# Patient Record
Sex: FEMALE | Race: WHITE | NOT HISPANIC OR LATINO | Employment: UNEMPLOYED | URBAN - METROPOLITAN AREA
[De-identification: names, ages, dates, MRNs, and addresses within clinical notes are randomized per-mention and may not be internally consistent; named-entity substitution may affect disease eponyms.]

---
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
## 2022-03-07 ENCOUNTER — HOSPITAL ENCOUNTER (OUTPATIENT)
Facility: HOSPITAL | Age: 22
Discharge: HOME | End: 2022-03-07
Attending: OBSTETRICS & GYNECOLOGY | Admitting: OBSTETRICS & GYNECOLOGY

## 2022-03-07 VITALS
TEMPERATURE: 97.2 F | HEIGHT: 69 IN | HEART RATE: 106 BPM | WEIGHT: 132.94 LBS | BODY MASS INDEX: 19.69 KG/M2 | SYSTOLIC BLOOD PRESSURE: 102 MMHG | OXYGEN SATURATION: 98 % | RESPIRATION RATE: 18 BRPM | DIASTOLIC BLOOD PRESSURE: 64 MMHG

## 2022-03-07 PROBLEM — O47.00 PREMATURE UTERINE CONTRACTIONS: Status: ACTIVE | Noted: 2022-03-07

## 2022-03-07 LAB — SARS-COV-2 RNA PNL SPEC NAA+PROBE: NOT DETECTED

## 2022-03-07 PROCEDURE — 96375 TX/PRO/DX INJ NEW DRUG ADDON: CPT

## 2022-03-07 PROCEDURE — C9803 HOPD COVID-19 SPEC COLLECT: HCPCS

## 2022-03-07 PROCEDURE — 0 MORPHINE SULFATE 4 MG/ML SOLUTION: Performed by: OBSTETRICS & GYNECOLOGY

## 2022-03-07 PROCEDURE — G0463 HOSPITAL OUTPT CLINIC VISIT: HCPCS

## 2022-03-07 PROCEDURE — 87635 SARS-COV-2 COVID-19 AMP PRB: CPT | Performed by: OBSTETRICS & GYNECOLOGY

## 2022-03-07 PROCEDURE — 96374 THER/PROPH/DIAG INJ IV PUSH: CPT

## 2022-03-07 RX ORDER — SODIUM CHLORIDE 0.9 % (FLUSH) 0.9 %
10 SYRINGE (ML) INJECTION EVERY 12 HOURS SCHEDULED
Status: DISCONTINUED | OUTPATIENT
Start: 2022-03-07 | End: 2022-03-07 | Stop reason: HOSPADM

## 2022-03-07 RX ORDER — MORPHINE SULFATE 1 MG/ML
2 INJECTION, SOLUTION EPIDURAL; INTRATHECAL; INTRAVENOUS
Status: DISCONTINUED | OUTPATIENT
Start: 2022-03-07 | End: 2022-03-07

## 2022-03-07 RX ORDER — SODIUM CHLORIDE, SODIUM LACTATE, POTASSIUM CHLORIDE, CALCIUM CHLORIDE 600; 310; 30; 20 MG/100ML; MG/100ML; MG/100ML; MG/100ML
999 INJECTION, SOLUTION INTRAVENOUS CONTINUOUS
Status: DISCONTINUED | OUTPATIENT
Start: 2022-03-07 | End: 2022-03-07 | Stop reason: HOSPADM

## 2022-03-07 RX ORDER — FAMOTIDINE 10 MG/ML
20 INJECTION, SOLUTION INTRAVENOUS 2 TIMES DAILY
Status: DISCONTINUED | OUTPATIENT
Start: 2022-03-07 | End: 2022-03-07 | Stop reason: HOSPADM

## 2022-03-07 RX ORDER — MORPHINE SULFATE 4 MG/ML
2 INJECTION, SOLUTION INTRAMUSCULAR; INTRAVENOUS
Status: DISCONTINUED | OUTPATIENT
Start: 2022-03-07 | End: 2022-03-07 | Stop reason: HOSPADM

## 2022-03-07 RX ORDER — SODIUM CHLORIDE 0.9 % (FLUSH) 0.9 %
10 SYRINGE (ML) INJECTION AS NEEDED
Status: DISCONTINUED | OUTPATIENT
Start: 2022-03-07 | End: 2022-03-07 | Stop reason: HOSPADM

## 2022-03-07 RX ADMIN — FAMOTIDINE 20 MG: 10 INJECTION INTRAVENOUS at 17:24

## 2022-03-07 RX ADMIN — SODIUM CHLORIDE, POTASSIUM CHLORIDE, SODIUM LACTATE AND CALCIUM CHLORIDE 999 ML/HR: 600; 310; 30; 20 INJECTION, SOLUTION INTRAVENOUS at 17:18

## 2022-03-07 RX ADMIN — MORPHINE SULFATE 2 MG: 4 INJECTION INTRAVENOUS at 19:49

## 2022-03-07 NOTE — NURSING NOTE
"Pt here per ambulatory from [facility] stating she was having ctx's. Pt stated she was having ctx's since a month ago she had fallen on ice x2 and was observed, \"didn't do anything.\" referring to \" at [place].\" Appearing aggitated and states she \"hates my doctor and not going back.\" She stated she was having ctx's since the falls but days ago they stopped and \"suddenly started back up x last 3 days. Denies any vaginal bleeding, no leakage of fluid, positive fetal movement noted. SVE performed 1cm, thick and -3. Pt rates pain \"6\" constant in back and low abd, but when ct'x start \"10.\" appears in no distsress or discomfort, talking through ctx's. Placed on EFM and oriented to room, explained plan of care and will continue to monitor.   "

## 2022-03-08 NOTE — EXTERNAL PATIENT INSTRUCTIONS
Patient Education   Table of Contents       Third Trimester of Pregnancy     To view videos and all your education online visit,   [email or web address]   or scan this QR code with your smartphone.                  Third Trimester of Pregnancy        The third trimester of pregnancy is from week 28 through week 40. This is also called months 7 through 9. This trimester is when your unborn baby (fetus) is growing very fast. At the end of the ninth month, the unborn baby is about 20 inches long. It weighs about 6?10 pounds.     Body changes during your third trimester   Your body continues to go through many changes during this time. The changes vary and generally return to normal after the baby is born.   Physical changes         Your weight will continue to increase. You may gain 25?35 pounds (11?16 kg) by the end of the pregnancy. If you are underweight, you may gain 28?40 lb (about 13?18 kg). If you are overweight, you may gain 15?25 lb (about 7?11 kg).       You may start to get stretch marks on your hips, belly (abdomen), and breasts.       Your breasts will continue to grow and may hurt. A yellow fluid (colostrum) may leak from your breasts. This is the first milk you are making for your baby.       You may have changes in your hair.       Your belly button may stick out.       You may have more swelling in your hands, face, or ankles.     Health changes         You may have heartburn.       You may have trouble pooping (constipation).       You may get hemorrhoids. These are swollen veins in the butt that can itch or get painful.       You may have swollen veins (varicose veins) in your legs.       You may have more body aches in the pelvis, back, or thighs.       You may have more tingling or numbness in your hands, arms, and legs. The skin on your belly may also feel numb.       You may feel short of breath as your womb (uterus) gets bigger.     Other changes         You may pee (urinate) more  "often.       You may have more problems sleeping.       You may notice the unborn baby \"dropping,\" or moving lower in your belly.       You may have more discharge coming from your vagina.       Your joints may feel loose, and you may have pain around your pelvic bone.     Follow these instructions at home:   Medicines         Take over-the-counter and prescription medicines only as told by your doctor. Some medicines are not safe during pregnancy.       Take a prenatal vitamin that contains at least 600 micrograms (mcg) of folic acid.     Eating and drinking        Eat healthy meals that include:       Fresh fruits and vegetables.       Whole grains.       Good sources of protein, such as meat, eggs, or tofu.       Low-fat dairy products.       Avoid raw meat and unpasteurized juice, milk, and cheese. These carry germs that can harm you and your baby.       Eat 4 or 5 small meals rather than 3 large meals a day.      You may need to take these actions to prevent or treat trouble pooping:       Drink enough fluids to keep your pee (urine) pale yellow.       Eat foods that are high in fiber. These include beans, whole grains, and fresh fruits and vegetables.       Limit foods that are high in fat and sugar. These include fried or sweet foods.     Activity         Exercise only as told by your doctor. Stop exercising if you start to have cramps in your womb.       Avoid heavy lifting.      Do not  exercise if it is too hot or too humid, or if you are in a place of great height (high altitude).       If you choose to, you may have sex unless your doctor tells you not to.     Relieving pain and discomfort         Take breaks often, and rest with your legs raised (elevated) if you have leg cramps or low back pain.       Take warm water baths (sitz baths) to soothe pain or discomfort caused by hemorrhoids. Use hemorrhoid cream if your doctor approves.       Wear a good support bra if your breasts are tender.      If you " develop bulging, swollen veins in your legs:       Wear support hose as told by your doctor.       Raise your feet for 15 minutes, 3?4 times a day.       Limit salt in your food.     Safety         Talk to your doctor before traveling far distances.      Do not  use hot tubs, steam rooms, or saunas.       Wear your seat belt at all times when you are in a car.       Talk with your doctor if someone is hurting you or yelling at you a lot.     Preparing for your baby's arrival    To prepare for the arrival of your baby:       Take prenatal classes.       Visit the hospital and tour the maternity area.       Buy a rear-facing car seat. Learn how to install it in your car.       Prepare the baby's room. Take out all pillows and stuffed animals from the baby's crib.     General instructions         Avoid cat litter boxes and soil used by cats. These carry germs that can cause harm to the baby and can cause a loss of your baby by miscarriage or stillbirth.      Do not  douche or use tampons. Do not  use scented sanitary pads.      Do not  smoke or use any products that contain nicotine or tobacco. If you need help quitting, ask your doctor.      Do not  drink alcohol.      Do not  use herbal medicines, illegal drugs, or medicines that were not approved by your doctor. Chemicals in these products can affect your baby.       Keep all follow-up visits. This is important.     Where to find more information         American Pregnancy Association: americanpregnancy.org         American College of Obstetricians and Gynecologists: www.acog.org         Office on Women's Health: womenshealth.gov/pregnancy       Contact a doctor if:         You have a fever.       You have mild cramps or pressure in your lower belly.       You have a nagging pain in your belly area.       You vomit, or you have watery poop (diarrhea).       You have bad-smelling fluid coming from your vagina.       You have pain when you pee, or your pee smells bad.        You have a headache that does not go away when you take medicine.       You have changes in how you see, or you see spots in front of your eyes.     Get help right away if:         Your water breaks.       You have regular contractions that are less than 5 minutes apart.       You are spotting or bleeding from your vagina.       You have very bad belly cramps or pain.       You have trouble breathing.       You have chest pain.       You faint.       You have not felt the baby move for the amount of time told by your doctor.       You have new or increased pain, swelling, or redness in an arm or leg.     Summary         The third trimester is from week 28 through week 40 (months 7 through 9). This is the time when your unborn baby is growing very fast.       During this time, your discomfort may increase as you gain weight and as your baby grows.       Get ready for your baby to arrive by taking prenatal classes, buying a rear-facing car seat, and preparing the baby's room.       Get help right away if you are bleeding from your vagina, you have chest pain and trouble breathing, or you have not felt the baby move for the amount of time told by your doctor.     This information is not intended to replace advice given to you by your health care provider. Make sure you discuss any questions you have with your health care provider.     Document Released: 03/14/2011Document Revised: 05/26/2021Document Reviewed: 04/01/2021     [employer] Patient Education ? 2021 [employer]